# Patient Record
Sex: FEMALE | Race: WHITE | NOT HISPANIC OR LATINO | URBAN - METROPOLITAN AREA
[De-identification: names, ages, dates, MRNs, and addresses within clinical notes are randomized per-mention and may not be internally consistent; named-entity substitution may affect disease eponyms.]

---

## 2023-11-20 ENCOUNTER — EVALUATION (OUTPATIENT)
Dept: OCCUPATIONAL THERAPY | Facility: CLINIC | Age: 58
End: 2023-11-20
Payer: COMMERCIAL

## 2023-11-20 DIAGNOSIS — M65.312 TRIGGER FINGER OF LEFT THUMB: Primary | ICD-10-CM

## 2023-11-20 PROCEDURE — 97535 SELF CARE MNGMENT TRAINING: CPT

## 2023-11-20 PROCEDURE — 97166 OT EVAL MOD COMPLEX 45 MIN: CPT

## 2023-11-20 PROCEDURE — 97140 MANUAL THERAPY 1/> REGIONS: CPT

## 2023-11-20 NOTE — LETTER
2023      No Recipients    Patient: Shannon Lilly   YOB: 1965   Date of Visit: 2023     Encounter Diagnosis     ICD-10-CM    1. Trigger finger of left thumb  M65.312           Dear Dr. Constance Zhang: Thank you for your recent referral of Shannon Lilly. Please review the attached evaluation summary from Chelita's recent visit. Please verify that you agree with the plan of care by signing the attached order. If you have any questions or concerns, please do not hesitate to call. I sincerely appreciate the opportunity to share in the care of one of your patients and hope to have another opportunity to work with you in the near future. Sincerely,    Art Xiao, OT      Referring Provider:     I certify that I have read the below Plan of Care and certify the need for these services furnished under this plan of treatment while under my care. Saurabh Villegas MD  [address] [address]  Via Fax: [phone]        OT Evaluation     Today's date: 2023  Patient name: Shannon Lilly  : 1965  MRN: [id number]  Referring provider: Saurabh Villegas MD  Dx:   Encounter Diagnosis     ICD-10-CM    1. Trigger finger of left thumb  M65.312                      Assessment  Assessment details: Patient presenting with a dx of left thumb Trigger Finger. Patient reports symptoms began over the summer. Patient reports locking and triggering over the past two weeks. Patient was prescribed NSAID for ten days course. Patient was referred to conservative management of OT prior to Ortho. Patient reports typing for a large majority of her work day. Patient had X-Rays completed with no findings. Patient has been conservative management. Patient is right hand dominant. Patient presenting with thumb brace this date.    Impairments: abnormal coordination, abnormal or restricted ROM, abnormal movement, activity intolerance, impaired physical strength, pain with function and weight-bearing intolerance    Symptom irritability: moderateUnderstanding of Dx/Px/POC: good   Prognosis: good    Goals  STGs    Pt will increase  strength by 5-10#. - Not Met    Pt will increase digit ROM by 50%. - Not Met    Pt will demonstrate decrease in edema by 25%. - Not Met    Independent with HEP. - Not Met      LTGs     Pt will increase  strength by an additional 5-10#. - Not Met    Pt will increase digit ROM to WNL. - Not Met    Pt will demonstrate decrease in edema by 50%. - Not Met    Pt will increase pinch strength by 3-5#. - Not Met    Pt will report an increase in ADL/IADL participation. - Not Met    Pt will report a decrease in clicking of thumb. - Not Met    Pt will report no more than a 0/10 pain with activity - Not Met    Pt will increase fine motor coordination as evident by an improvement in 9-hole peg test by 3 seconds. - Not Met              Plan  Plan details: Patient has presenting to OP OT services with a dx of of left thumb trigger finger. Patient demonstrating increased pain, decreased strength, decreased ROM and decreased activity. Pt would benefit from continued Occupational Therapy services one time per week for 4 weeks to return to prior level of function and achieve all established goals.  Thank you for the referral!    Patient would benefit from: OT eval and skilled occupational therapy  Referral necessary: Yes  Planned modality interventions: cryotherapy, TENS, ultrasound, unattended electrical stimulation, thermotherapy: paraffin bath and thermotherapy: hydrocollator packs  Planned therapy interventions: IADL retraining, manual therapy, massage, motor coordination training, patient education, self care, strengthening, stretching, therapeutic activities, therapeutic exercise, home exercise program, functional ROM exercises and fine motor coordination training  Frequency: 1x week  Duration in visits: 4  Duration in weeks: 4  Treatment plan discussed with: patient        Subjective Evaluation    History of Present Illness  Mechanism of injury: Left Trigger Thumb          Not a recurrent problem   Quality of life: good    Patient Goals  Patient goals for therapy: decreased edema, decreased pain, increased motion, increased strength and independence with ADLs/IADLs    Pain  Current pain ratin  At best pain ratin  At worst pain ratin  Location: Left Thumb  Quality: pulling  Relieving factors: support    Social Support    Employment status: working  Hand dominance: right      Diagnostic Tests  X-ray: normal  Treatments  Current treatment: immobilization and occupational therapy      Objective     Neurological Testing     Sensation     Wrist/Hand   Left   Intact: light touch    Right   Intact: light touch    Active Range of Motion     Left Wrist   Wrist flexion: 70 degrees   Wrist extension: 100 degrees   Radial deviation: 30 degrees   Ulnar deviation: 50 degrees     Right Wrist   Normal active range of motion    Left Thumb   Flexion     MP: 40 degrees    DIP: 30 degrees  Palmar Abduction     CMC: 60 degrees    Opposition: WNL    Right Thumb   Opposition: WNL    Additional Active Range of Motion Details  Composite fist noted  Decreased thumb ROM compared to right thumb    Strength/Myotome Testing     Left Wrist/Hand   Wrist extension: 3+  Wrist flexion: 3+  Radial deviation: 3+  Ulnar deviation: 3+     (2nd hand position)     Trial 1: 40    Thumb Strength  Key/Lateral Pinch     Trial 1: 6    Right Wrist/Hand   Normal wrist strength     (2nd hand position)     Trial 1: 55    Thumb Strength   Key/Lateral Pinch     Trial 1: 12    Additional Strength Details  Patient demonstrating with a decrease in wrist,  and pinch strength compared to unaffected upper extremity.       Swelling     Left Wrist/Hand   Thumb     Proximal: 7 cm    Distal: 6.3 cm    Additional Swelling Details  Slight increase in left proximal thumb swelling compared to right hand    Right Wrist/Hand   Thumb     Proximal: 6.8 cm    Distal: 6.3 cm  Neuro Exam:     Functional outcomes   Left 9 peg hole test: 20.27 (seconds)  Right 9 hole peg test: 14.07 (seconds)      Flowsheet Rows      Flowsheet Row Most Recent Value   PT/OT G-Codes    Current Score 51   Projected Score 65          Patient tolerated session well. Patient and therapist discussed exercises as per initial HEP. Patient verbalized understanding of education and demonstrated proper technique. Therapist will make increases as tolerated.  Continue with POC            Precautions: Trigger Finger  Initial Evaluation completed on: 11/20/2023  Re-Evaluation needs to be completed before: 12/20/2023  Insurance: Nicole Lightn: 11/20/2023  Auth Visits: N/A        Manuals 11/20/2023       IASTM Gt#3  GT#6  10' Thumb       Digit Flexor Stretch 30 sec x 3       KT Tape        ART Thumb X 5       Cupping                Neuro Re-Ed  11/20/2023                                               Ther Ex 11/20/2023       Thumb IP Blocking  MP Blocking HEP       Thumb Extension Stretch HEP       Thumb Abduction Stretch HEP       Rubberband  Thumb Flexion  Thumb Ext  Radial Add  Radial Abd  Finger Abd        Opposition        Digi-Flex        Theraputty  Grasps  Pinches  Thumb Presses        Hand Power Pro                Ther Activity 11/20/2023       Tension Pin Pom Pom        Grooved Peg Board                                Modalities 11/20/2023       Ultrasound 3.3 Mhz  50%  0.8 intensity       Paraffin w/ MH 10' end of session       Splinting

## 2023-11-20 NOTE — PROGRESS NOTES
OT Evaluation     Today's date: 2023  Patient name: Jasmine Amado  : 1965  MRN: [id number]  Referring provider: Sal Boxer, MD  Dx:   Encounter Diagnosis     ICD-10-CM    1. Trigger finger of left thumb  M65.312                      Assessment  Assessment details: Patient presenting with a dx of left thumb Trigger Finger. Patient reports symptoms began over the summer. Patient reports locking and triggering over the past two weeks. Patient was prescribed NSAID for ten days course. Patient was referred to conservative management of OT prior to Ortho. Patient reports typing for a large majority of her work day. Patient had X-Rays completed with no findings. Patient has been conservative management. Patient is right hand dominant. Patient presenting with thumb brace this date. Impairments: abnormal coordination, abnormal or restricted ROM, abnormal movement, activity intolerance, impaired physical strength, pain with function and weight-bearing intolerance    Symptom irritability: moderateUnderstanding of Dx/Px/POC: good   Prognosis: good    Goals  STGs    Pt will increase  strength by 5-10#. - Not Met    Pt will increase digit ROM by 50%. - Not Met    Pt will demonstrate decrease in edema by 25%. - Not Met    Independent with HEP. - Not Met      LTGs     Pt will increase  strength by an additional 5-10#. - Not Met    Pt will increase digit ROM to WNL. - Not Met    Pt will demonstrate decrease in edema by 50%. - Not Met    Pt will increase pinch strength by 3-5#. - Not Met    Pt will report an increase in ADL/IADL participation. - Not Met    Pt will report a decrease in clicking of thumb. - Not Met    Pt will report no more than a 0/10 pain with activity - Not Met    Pt will increase fine motor coordination as evident by an improvement in 9-hole peg test by 3 seconds.  - Not Met              Plan  Plan details: Patient has presenting to OP OT services with a dx of of left thumb trigger finger. Patient demonstrating increased pain, decreased strength, decreased ROM and decreased activity. Pt would benefit from continued Occupational Therapy services one time per week for 4 weeks to return to prior level of function and achieve all established goals.  Thank you for the referral!    Patient would benefit from: OT eval and skilled occupational therapy  Referral necessary: Yes  Planned modality interventions: cryotherapy, TENS, ultrasound, unattended electrical stimulation, thermotherapy: paraffin bath and thermotherapy: hydrocollator packs  Planned therapy interventions: IADL retraining, manual therapy, massage, motor coordination training, patient education, self care, strengthening, stretching, therapeutic activities, therapeutic exercise, home exercise program, functional ROM exercises and fine motor coordination training  Frequency: 1x week  Duration in visits: 4  Duration in weeks: 4  Treatment plan discussed with: patient        Subjective Evaluation    History of Present Illness  Mechanism of injury: Left Trigger Thumb          Not a recurrent problem   Quality of life: good    Patient Goals  Patient goals for therapy: decreased edema, decreased pain, increased motion, increased strength and independence with ADLs/IADLs    Pain  Current pain ratin  At best pain ratin  At worst pain ratin  Location: Left Thumb  Quality: pulling  Relieving factors: support    Social Support    Employment status: working  Hand dominance: right      Diagnostic Tests  X-ray: normal  Treatments  Current treatment: immobilization and occupational therapy      Objective     Neurological Testing     Sensation     Wrist/Hand   Left   Intact: light touch    Right   Intact: light touch    Active Range of Motion     Left Wrist   Wrist flexion: 70 degrees   Wrist extension: 100 degrees   Radial deviation: 30 degrees   Ulnar deviation: 50 degrees     Right Wrist   Normal active range of motion    Left Thumb Flexion     MP: 40 degrees    DIP: 30 degrees  Palmar Abduction     CMC: 60 degrees    Opposition: WNL    Right Thumb   Opposition: WNL    Additional Active Range of Motion Details  Composite fist noted  Decreased thumb ROM compared to right thumb    Strength/Myotome Testing     Left Wrist/Hand   Wrist extension: 3+  Wrist flexion: 3+  Radial deviation: 3+  Ulnar deviation: 3+     (2nd hand position)     Trial 1: 40    Thumb Strength  Key/Lateral Pinch     Trial 1: 6    Right Wrist/Hand   Normal wrist strength     (2nd hand position)     Trial 1: 55    Thumb Strength   Key/Lateral Pinch     Trial 1: 12    Additional Strength Details  Patient demonstrating with a decrease in wrist,  and pinch strength compared to unaffected upper extremity. Swelling     Left Wrist/Hand   Thumb     Proximal: 7 cm    Distal: 6.3 cm    Additional Swelling Details  Slight increase in left proximal thumb swelling compared to right hand    Right Wrist/Hand   Thumb     Proximal: 6.8 cm    Distal: 6.3 cm  Neuro Exam:     Functional outcomes   Left 9 peg hole test: 20.27 (seconds)  Right 9 hole peg test: 14.07 (seconds)      Flowsheet Rows      Flowsheet Row Most Recent Value   PT/OT G-Codes    Current Score 51   Projected Score 65          Patient tolerated session well. Patient and therapist discussed exercises as per initial HEP. Patient verbalized understanding of education and demonstrated proper technique. Therapist will make increases as tolerated.  Continue with POC            Precautions: Trigger Finger  Initial Evaluation completed on: 11/20/2023  Re-Evaluation needs to be completed before: 12/20/2023  Insurance: Loretta Inks: 11/20/2023  Auth Visits: N/A        Manuals 11/20/2023       IASTM Gt#3  GT#6  10' Thumb       Digit Flexor Stretch 30 sec x 3       KT Tape        ART Thumb X 5       Cupping                Neuro Re-Ed  11/20/2023                                               Ther Ex 11/20/2023       Thumb IP Blocking  MP Blocking HEP       Thumb Extension Stretch HEP       Thumb Abduction Stretch HEP       Rubberband  Thumb Flexion  Thumb Ext  Radial Add  Radial Abd  Finger Abd        Opposition        Digi-Flex        Theraputty  Grasps  Pinches  Thumb Presses        Hand Power Pro                Ther Activity 11/20/2023       Tension Pin Pom Pom        Grooved Peg Board                                Modalities 11/20/2023       Ultrasound 3.3 Mhz  50%  0.8 intensity       Paraffin w/ MH 10' end of session       Splinting        MH

## 2023-11-29 ENCOUNTER — OFFICE VISIT (OUTPATIENT)
Dept: OCCUPATIONAL THERAPY | Facility: CLINIC | Age: 58
End: 2023-11-29
Payer: COMMERCIAL

## 2023-11-29 DIAGNOSIS — M65.312 TRIGGER FINGER OF LEFT THUMB: Primary | ICD-10-CM

## 2023-11-29 PROCEDURE — 97110 THERAPEUTIC EXERCISES: CPT

## 2023-11-29 PROCEDURE — 97140 MANUAL THERAPY 1/> REGIONS: CPT

## 2023-11-29 PROCEDURE — 97018 PARAFFIN BATH THERAPY: CPT

## 2023-11-29 PROCEDURE — 97035 APP MDLTY 1+ULTRASOUND EA 15: CPT

## 2023-11-29 NOTE — PROGRESS NOTES
Daily Note     Today's date: 2023  Patient name: Hans Lezama  : 1965  MRN: [id number]  Referring provider: Mandie Queen MD  Dx:   Encounter Diagnosis     ICD-10-CM    1. Trigger finger of left thumb  M65.312                      Subjective: It's doing much better this week than last week. Objective: See treatment diary below      Assessment: Tolerated treatment well. Patient demonstrated fatigue post treatment, exhibited good technique with therapeutic exercises, and would benefit from continued OT. Pain presents with decrease pain than previous tx session. Tolerated light strengthening well. Plan: Continue per plan of care. Progress treatment as tolerated.           Precautions: Trigger Finger  Initial Evaluation completed on: 2023  Re-Evaluation needs to be completed before: 2023  Insurance: [employer]: 2023  Auth Visits: N/A        Manuals 2023      IASTM Gt#3  GT#6  10' Thumb GT #3  10' thumb      Digit Flexor Stretch 30 sec x 3 30 sec x 3      KT Tape        ART Thumb X 5       Cupping                Neuro Re-Ed  2023                                              Ther Ex 2023      Thumb IP Blocking  MP Blocking HEP X 10      Thumb Extension Stretch HEP 10 sec x 5      Thumb Abduction Stretch HEP       Rubberband  Thumb Flexion  Thumb Ext  Radial Add  Radial Abd  Finger Abd  Red  X 10/2      Opposition  Tennis ball  beads      Digi-Flex  Red x 20      Theraputty  Grasps  Pinches  Thumb Presses        Hand Power Pro                Ther Activity 2023      Tension Pin Pom Pom        Grooved Peg Board                                Modalities 2023      Ultrasound 3.3 Mhz  50%  0.8 intensity 3.3 MHz  50%  0.8 intensity      Paraffin w/ MH 10' end of session 10' end of session      Splinting

## 2023-12-06 ENCOUNTER — OFFICE VISIT (OUTPATIENT)
Dept: OCCUPATIONAL THERAPY | Facility: CLINIC | Age: 58
End: 2023-12-06
Payer: COMMERCIAL

## 2023-12-06 DIAGNOSIS — M65.312 TRIGGER FINGER OF LEFT THUMB: Primary | ICD-10-CM

## 2023-12-06 PROCEDURE — 97110 THERAPEUTIC EXERCISES: CPT

## 2023-12-06 PROCEDURE — 97018 PARAFFIN BATH THERAPY: CPT

## 2023-12-06 PROCEDURE — 97140 MANUAL THERAPY 1/> REGIONS: CPT

## 2023-12-06 PROCEDURE — 97035 APP MDLTY 1+ULTRASOUND EA 15: CPT

## 2023-12-06 NOTE — PROGRESS NOTES
Daily Note     Today's date: 2023  Patient name: Diya Resendiz  : 1965  MRN: [id number]  Referring provider: Latesha Linares MD  Dx:   Encounter Diagnosis     ICD-10-CM    1. Trigger finger of left thumb  M65.312                      Subjective: It still hurts but a little better. Objective: See treatment diary below      Assessment: Tolerated treatment well. Patient demonstrated fatigue post treatment, exhibited good technique with therapeutic exercises, and would benefit from continued OT. Pain 3, triggering still happening. Tolerated additional exercises. Plan: Continue per plan of care. Progress treatment as tolerated.        Precautions: Trigger Finger  Initial Evaluation completed on: 2023  Re-Evaluation needs to be completed before: 2023  Insurance: [employer]: 2023  Auth Visits: N/A        Manuals 2023     IASTM Gt#3  GT#6  10' Thumb GT #3  10' thumb GT #3  10' thumb     Digit Flexor Stretch 30 sec x 3 30 sec x 3 30 sec x 3     KT Tape        ART Thumb X 5       Cupping                Neuro Re-Ed  2023                                             Ther Ex 2023     Thumb IP Blocking  MP Blocking HEP X 10 X 10     Thumb Extension Stretch HEP 10 sec x 5 10 sec x 5     Thumb Abduction Stretch HEP       Rubberband  Thumb Flexion  Thumb Ext  Radial Add  Radial Abd  Finger Abd  Red  X 10/2 Red  X 10/2  X 10/2      X 10/2     Opposition  Tennis ball  beads Tennis ball  beads     Digi-Flex  Red x 20 Red x 20     Theraputty  Grasps  Pinches  Thumb Presses        Hand Power Pro                Ther Activity 2023     Tension Pin Pom Pom   YTP     Grooved Peg Board   Performed all in/out                             Modalities 2023     Ultrasound 3.3 Mhz  50%  0.8 intensity 3.3 MHz  50%  0.8 intensity 3.3 MHz  50%  0.8 intensity Paraffin w/ MH 10' end of session 10' end of session 10' end of session     Splinting        MH

## 2023-12-13 ENCOUNTER — OFFICE VISIT (OUTPATIENT)
Dept: OCCUPATIONAL THERAPY | Facility: CLINIC | Age: 58
End: 2023-12-13
Payer: COMMERCIAL

## 2023-12-13 DIAGNOSIS — M65.312 TRIGGER FINGER OF LEFT THUMB: Primary | ICD-10-CM

## 2023-12-13 PROCEDURE — 97018 PARAFFIN BATH THERAPY: CPT

## 2023-12-13 PROCEDURE — 97110 THERAPEUTIC EXERCISES: CPT

## 2023-12-13 PROCEDURE — 97140 MANUAL THERAPY 1/> REGIONS: CPT

## 2023-12-13 NOTE — PROGRESS NOTES
Daily Note     Today's date: 2023  Patient name: Steve William  : 1965  MRN: [id number]  Referring provider: Damien Arguello MD  Dx:   Encounter Diagnosis     ICD-10-CM    1. Trigger finger of left thumb  M65.312                      Subjective: It does feel a little better. Objective: See treatment diary below      Assessment: Tolerated treatment well. Patient demonstrated fatigue post treatment, exhibited good technique with therapeutic exercises, and would benefit from continued OT. Pain pre 0 at rest and increase to 5-6, post tx 3-4. Trialed KT tape, pt verbalized it felt better with same.  40#, pinch lateral 6#. Plan: Continue per plan of care. Progress treatment as tolerated.        Precautions: Trigger Finger  Initial Evaluation completed on: 2023  Re-Evaluation needs to be completed before: 2023  Insurance: [employer]: 2023  Auth Visits: N/A        Manuals 2023    IASTM Gt#3  GT#6  10' Thumb GT #3  10' thumb GT #3  10' thumb GT # 3    Digit Flexor Stretch 30 sec x 3 30 sec x 3 30 sec x 3 30 sec x 3    KT Tape    performed    ART Thumb X 5       Cupping                Neuro Re-Ed  2023                                            Ther Ex 2023    Thumb IP Blocking  MP Blocking HEP X 10 X 10 X 10    Thumb Extension Stretch HEP 10 sec x 5 10 sec x 5 10 sec x 5    Thumb Abduction Stretch HEP       Rubberband  Thumb Flexion  Thumb Ext  Radial Add  Radial Abd  Finger Abd  Red  X 10/2 Red  X 10/2  X 10/2      X 10/2 Red  X 10/2  X 10/2      X 10/2    Opposition  Tennis ball  beads Tennis ball  beads Tennis ball  beads    Digi-Flex  Red x 20 Red x 20 Red x 20    Theraputty  Grasps  Pinches  Thumb Presses        Hand Power Pro        Power web    Thumb  Intrinsic plus    Yellow  X 20  X 20    Ther Activity 2023 12/6/2023 12/13/2023    Tension Pin Pom Pom   YTP YTP    Grooved Peg Board   Performed all in/out 2:04                            Modalities 11/20/2023 11/29/2023 12/6/2023 12/13/2023    Ultrasound 3.3 Mhz  50%  0.8 intensity 3.3 MHz  50%  0.8 intensity 3.3 MHz  50%  0.8 intensity 3.3 MHz  50%  0.8 intensity    Paraffin w/ MH 10' end of session 10' end of session 10' end of session 10' end of session    Splinting

## 2023-12-20 ENCOUNTER — OFFICE VISIT (OUTPATIENT)
Dept: OCCUPATIONAL THERAPY | Facility: CLINIC | Age: 58
End: 2023-12-20
Payer: COMMERCIAL

## 2023-12-20 DIAGNOSIS — M65.312 TRIGGER FINGER OF LEFT THUMB: Primary | ICD-10-CM

## 2023-12-20 PROCEDURE — 97150 GROUP THERAPEUTIC PROCEDURES: CPT

## 2023-12-20 PROCEDURE — 97110 THERAPEUTIC EXERCISES: CPT

## 2023-12-20 NOTE — PROGRESS NOTES
Daily Note     Today's date: 2023  Patient name: Chelita King  : 1965  MRN: [id number]  Referring provider: Blanquita Villa MD  Dx:   Encounter Diagnosis     ICD-10-CM    1. Trigger finger of left thumb  M65.312                      TIME:  4:45 - 5:00 =   5:00 - 5:15 = Group  5:15 - 5:45 = Unsupervised    Subjective: I think I have a little more room before it triggers, ill take what I can get.      Objective: See treatment diary below      Assessment: Tolerated treatment well. Patient demonstrated fatigue post treatment, exhibited good technique with therapeutic exercises, and would benefit from continued OT. Pt states no pain at rest, and when it triggers it is less pain than it used to be. Pt states KT tape was helpful with it lasting 3 days prior to taking it off and having a friend re-do it. Minimal pain/discomfort and fatigue noted during exercises, with stretch breaks in between required. Pt participated in skilled OT this date with focus to ROM, pain management, and HEP development, for increased safety and engagement in ADL/IADL tasks.     Plan: Continue per plan of care.  Progress treatment as tolerated.       Precautions: Trigger Finger  Initial Evaluation completed on: 2023  Re-Evaluation needs to be completed before: 2023  Insurance: [employer]  FOTO: 2023  Auth Visits: N/A        Manuals  2023   IASTM  GT #3  10' thumb GT #3  10' thumb GT # 3 GT #3 8'   Digit Flexor Stretch  30 sec x 3 30 sec x 3 30 sec x 3    KT Tape    performed Applied   ART Thumb        Cupping                Neuro Re-Ed   2023                                           Ther Ex  2023   Thumb IP Blocking  MP Blocking  X 10 X 10 X 10    Thumb Extension Stretch  10 sec x 5 10 sec x 5 10 sec x 5    Thumb Abduction Stretch        Rubberband  Thumb Flexion  Thumb Ext  Radial Add  Radial  Abd  Finger Abd  Red  X 10/2 Red  X 10/2  X 10/2      X 10/2 Red  X 10/2  X 10/2      X 10/2 Red RB  X 25  X 20      X 20   Opposition  Tennis ball  beads Tennis ball  beads Tennis ball  beads Tennis Ball  beads   Digi-Flex  Red x 20 Red x 20 Red x 20 Red  X 20     Theraputty  Grasps  Pinches  Thumb Presses        Hand Power Pro        Power web    Thumb  Intrinsic plus    Yellow  X 20  X 20 Yellow  X 20  X 20   Ther Activity  11/29/2023 12/6/2023 12/13/2023 12/20/2023   Tension Pin Pom Pom   YTP YTP Yellow pin all poms   Grooved Peg Board   Performed all in/out 2:04                            Modalities  11/29/2023 12/6/2023 12/13/2023 12/20/2023   Ultrasound  3.3 MHz  50%  0.8 intensity 3.3 MHz  50%  0.8 intensity 3.3 MHz  50%  0.8 intensity 3.3 Mhz, 50%, 0.8 intensity   Paraffin w/ MH  10' end of session 10' end of session 10' end of session 10' end of session   Splinting

## 2023-12-29 ENCOUNTER — EVALUATION (OUTPATIENT)
Dept: OCCUPATIONAL THERAPY | Facility: CLINIC | Age: 58
End: 2023-12-29
Payer: COMMERCIAL

## 2023-12-29 DIAGNOSIS — M65.312 TRIGGER FINGER OF LEFT THUMB: Primary | ICD-10-CM

## 2023-12-29 PROCEDURE — 97140 MANUAL THERAPY 1/> REGIONS: CPT

## 2023-12-29 PROCEDURE — 97110 THERAPEUTIC EXERCISES: CPT

## 2023-12-29 PROCEDURE — 97018 PARAFFIN BATH THERAPY: CPT

## 2023-12-29 PROCEDURE — 97166 OT EVAL MOD COMPLEX 45 MIN: CPT

## 2023-12-29 NOTE — PROGRESS NOTES
OT Re-Evaluation     Today's date: 2023  Patient name: Chelita King  : 1965  MRN: [id number]  Referring provider: Blanquita Villa MD  Dx:   Encounter Diagnosis     ICD-10-CM    1. Trigger finger of left thumb  M65.312                        Assessment  Assessment details: Patient presenting with a dx of left thumb Trigger Finger. Patient reports symptoms began over the summer. Patient reports locking and triggering over the past two weeks. Patient was prescribed NSAID for ten days course. Patient was referred to conservative management of OT prior to Ortho. Patient reports typing for a large majority of her work day.  Patient had X-Rays completed with no findings. Patient has been conservative management. Patient is right hand dominant. Patient presenting with thumb brace this date.     Re-assessment completed on 2024. Patient has consistently attended OP OT services since start of care. Patient has made steady progress towards established goals. Patient demonstrating with increases in  strength, pinch strength, fine motor coordination and decreases in pain. Patient has not met all established goals and will benefit from continued OT services to maximize level of function. Patient does not have any follow-up appointments with PCP. Patient reports improvement in symptoms as compared to start of care. Patient continues to present with decrease IP flexion and swelling. Patient will trial use of silicone gel sleeve to decrease swelling to see if symptoms continue to improve. Patient and therapist discussed continuation of OT services 1 a week for an additional 4 weeks.     Impairments: abnormal coordination, abnormal or restricted ROM, abnormal movement, activity intolerance, impaired physical strength, pain with function and weight-bearing intolerance    Symptom irritability: moderateUnderstanding of Dx/Px/POC: good   Prognosis: good    Goals  STGs    Pt will increase  strength by 5-10#. -  Met    Pt will increase digit ROM by 50%. - Progressing    Pt will demonstrate decrease in edema by 25%. - Not Met    Independent with HEP. - Met      LTGs     Pt will increase  strength by an additional 5-10#. - Met    Pt will increase digit ROM to WNL. - Progressing    Pt will demonstrate decrease in edema by 50%. - Not Met    Pt will increase pinch strength by 3-5#. - Progressing    Pt will report an increase in ADL/IADL participation. - Progressing    Pt will report a decrease in clicking of thumb. - Progressing    Pt will report no more than a 0/10 pain with activity - Progressing    Pt will increase fine motor coordination as evident by an improvement in 9-hole peg test by 3 seconds. - Progressing              Plan  Plan details: Continue with POC    Patient would benefit from: OT eval and skilled occupational therapy  Referral necessary: Yes  Planned modality interventions: cryotherapy, TENS, ultrasound, unattended electrical stimulation, thermotherapy: paraffin bath and thermotherapy: hydrocollator packs  Planned therapy interventions: IADL retraining, manual therapy, massage, motor coordination training, patient education, self care, strengthening, stretching, therapeutic activities, therapeutic exercise, home exercise program, functional ROM exercises and fine motor coordination training  Frequency: 1x week  Duration in visits: 4  Duration in weeks: 4  Treatment plan discussed with: patient        Subjective Evaluation    History of Present Illness  Mechanism of injury: Left Trigger Thumb          Not a recurrent problem   Quality of life: good    Patient Goals  Patient goals for therapy: decreased edema, decreased pain, increased motion, increased strength and independence with ADLs/IADLs    Pain  Current pain ratin  At best pain ratin  At worst pain ratin  Location: Left Thumb  Quality: pulling  Relieving factors: support    Social Support    Employment status: working  Hand dominance:  right      Diagnostic Tests  X-ray: normal  Treatments  Current treatment: immobilization and occupational therapy      Objective     Neurological Testing     Sensation     Wrist/Hand   Left   Intact: light touch    Right   Intact: light touch    Active Range of Motion     Left Wrist   Wrist flexion: 70 degrees   Wrist extension: 100 degrees   Radial deviation: 30 degrees   Ulnar deviation: 50 degrees     Right Wrist   Normal active range of motion    Left Thumb   Flexion     MP: 50 degrees    DIP: 30 degrees  Palmar Abduction     CMC: 70 degrees    Opposition: WNL    Right Thumb   Opposition: WNL    Additional Active Range of Motion Details  Composite fist noted  Increased thumb ROM compared to SOC    Strength/Myotome Testing     Left Wrist/Hand   Wrist extension: 4-  Wrist flexion: 4-  Radial deviation: 4-  Ulnar deviation: 4-     (2nd hand position)     Trial 1: 50    Thumb Strength  Key/Lateral Pinch     Trial 1: 8    Right Wrist/Hand   Normal wrist strength     (2nd hand position)     Trial 1: 55    Thumb Strength   Key/Lateral Pinch     Trial 1: 12    Additional Strength Details  Patient demonstrating with an increase in wrist,  and pinch strength compared to last assessment.      Swelling     Left Wrist/Hand   Thumb     Proximal: 7 cm    Distal: 6.3 cm    Additional Swelling Details  Slight increase in left proximal thumb swelling compared to right hand    Right Wrist/Hand   Thumb     Proximal: 6.8 cm    Distal: 6.3 cm  Neuro Exam:     Functional outcomes   Left 9 peg hole test: 20.27 (seconds)  Right 9 hole peg test: 14.07 (seconds)  Functional outcome comment: Re-assessment completed on 12/29/2023  Left Hand: 20.24                     Precautions: Trigger Finger  Initial Evaluation completed on: 11/20/2023  Re-Evaluation needs to be completed before: 01/29/2023  Insurance: [employer]  FOTO: 11/20/2023  Auth Visits: N/A        Manuals 12/29/2023       IAS GT #3 8'       Digit Flexor Stretch         KT Tape        ART Thumb        Cupping        Edema Manage Silicone Gel Sleeve               Neuro Re-Ed  12/29/2023                                               Ther Ex 12/29/2023       Thumb IP Blocking  MP Blocking        Thumb Extension Stretch        Thumb Abduction Stretch        Rubberband  Thumb Flexion  Thumb Ext  Radial Add  Radial Abd  Finger Abd Red RB  X 25  X 20      X 20       Opposition Tennis Ball  beads       Digi-Flex Red  X 20       Theraputty  Grasps  Pinches  Thumb Presses        Hand Power Pro        Power web    Thumb  Intrinsic plus Yellow  X 20  X 20               Ther Activity 12/29/2023       Tension Pin Pom Pom Yellow pin all poms       Grooved Peg Board                                Modalities 12/29/2023       Ultrasound 3.3 Mhz, 50%, 0.8 intensity       Paraffin w/ MH 10' end of session       Splinting        MH

## 2023-12-29 NOTE — LETTER
2023      No Recipients    Patient: Chelita King   YOB: 1965   Date of Visit: 2023     Encounter Diagnosis     ICD-10-CM    1. Trigger finger of left thumb  M65.312           Dear Dr. Villa:    Thank you for your recent referral of Chelita King. Please review the attached evaluation summary from Chelita's recent visit.     Please verify that you agree with the plan of care by signing the attached order.     If you have any questions or concerns, please do not hesitate to call.     I sincerely appreciate the opportunity to share in the care of one of your patients and hope to have another opportunity to work with you in the near future.     Sincerely,    Toan Thomas, OT      Referring Provider:     I certify that I have read the below Plan of Care and certify the need for these services furnished under this plan of treatment while under my care.                    Blanquita Villa MD  [address] [place] [id number]  Via Fax: [phone]        OT Re-Evaluation     Today's date: 2023  Patient name: Chelita King  : 1965  MRN: [id number]  Referring provider: Blanquita Villa MD  Dx:   Encounter Diagnosis     ICD-10-CM    1. Trigger finger of left thumb  M65.312                        Assessment  Assessment details: Patient presenting with a dx of left thumb Trigger Finger. Patient reports symptoms began over the summer. Patient reports locking and triggering over the past two weeks. Patient was prescribed NSAID for ten days course. Patient was referred to conservative management of OT prior to Ortho. Patient reports typing for a large majority of her work day.  Patient had X-Rays completed with no findings. Patient has been conservative management. Patient is right hand dominant. Patient presenting with thumb brace this date.     Re-assessment completed on 2024. Patient has consistently attended OP OT services since start of care. Patient has made  steady progress towards established goals. Patient demonstrating with increases in  strength, pinch strength, fine motor coordination and decreases in pain. Patient has not met all established goals and will benefit from continued OT services to maximize level of function. Patient does not have any follow-up appointments with PCP. Patient reports improvement in symptoms as compared to start of care. Patient continues to present with decrease IP flexion and swelling. Patient will trial use of silicone gel sleeve to decrease swelling to see if symptoms continue to improve. Patient and therapist discussed continuation of OT services 1 a week for an additional 4 weeks.     Impairments: abnormal coordination, abnormal or restricted ROM, abnormal movement, activity intolerance, impaired physical strength, pain with function and weight-bearing intolerance    Symptom irritability: moderateUnderstanding of Dx/Px/POC: good   Prognosis: good    Goals  STGs    Pt will increase  strength by 5-10#. - Met    Pt will increase digit ROM by 50%. - Progressing    Pt will demonstrate decrease in edema by 25%. - Not Met    Independent with HEP. - Met      LTGs     Pt will increase  strength by an additional 5-10#. - Met    Pt will increase digit ROM to WNL. - Progressing    Pt will demonstrate decrease in edema by 50%. - Not Met    Pt will increase pinch strength by 3-5#. - Progressing    Pt will report an increase in ADL/IADL participation. - Progressing    Pt will report a decrease in clicking of thumb. - Progressing    Pt will report no more than a 0/10 pain with activity - Progressing    Pt will increase fine motor coordination as evident by an improvement in 9-hole peg test by 3 seconds. - Progressing              Plan  Plan details: Continue with POC    Patient would benefit from: OT eval and skilled occupational therapy  Referral necessary: Yes  Planned modality interventions: cryotherapy, TENS, ultrasound, unattended  electrical stimulation, thermotherapy: paraffin bath and thermotherapy: hydrocollator packs  Planned therapy interventions: IADL retraining, manual therapy, massage, motor coordination training, patient education, self care, strengthening, stretching, therapeutic activities, therapeutic exercise, home exercise program, functional ROM exercises and fine motor coordination training  Frequency: 1x week  Duration in visits: 4  Duration in weeks: 4  Treatment plan discussed with: patient        Subjective Evaluation    History of Present Illness  Mechanism of injury: Left Trigger Thumb          Not a recurrent problem   Quality of life: good    Patient Goals  Patient goals for therapy: decreased edema, decreased pain, increased motion, increased strength and independence with ADLs/IADLs    Pain  Current pain ratin  At best pain ratin  At worst pain ratin  Location: Left Thumb  Quality: pulling  Relieving factors: support    Social Support    Employment status: working  Hand dominance: right      Diagnostic Tests  X-ray: normal  Treatments  Current treatment: immobilization and occupational therapy      Objective     Neurological Testing     Sensation     Wrist/Hand   Left   Intact: light touch    Right   Intact: light touch    Active Range of Motion     Left Wrist   Wrist flexion: 70 degrees   Wrist extension: 100 degrees   Radial deviation: 30 degrees   Ulnar deviation: 50 degrees     Right Wrist   Normal active range of motion    Left Thumb   Flexion     MP: 50 degrees    DIP: 30 degrees  Palmar Abduction     CMC: 70 degrees    Opposition: WNL    Right Thumb   Opposition: WNL    Additional Active Range of Motion Details  Composite fist noted  Increased thumb ROM compared to SOC    Strength/Myotome Testing     Left Wrist/Hand   Wrist extension: 4-  Wrist flexion: 4-  Radial deviation: 4-  Ulnar deviation: 4-     (2nd hand position)     Trial 1: 50    Thumb Strength  Key/Lateral Pinch     Trial 1:  8    Right Wrist/Hand   Normal wrist strength     (2nd hand position)     Trial 1: 55    Thumb Strength   Key/Lateral Pinch     Trial 1: 12    Additional Strength Details  Patient demonstrating with an increase in wrist,  and pinch strength compared to last assessment.      Swelling     Left Wrist/Hand   Thumb     Proximal: 7 cm    Distal: 6.3 cm    Additional Swelling Details  Slight increase in left proximal thumb swelling compared to right hand    Right Wrist/Hand   Thumb     Proximal: 6.8 cm    Distal: 6.3 cm  Neuro Exam:     Functional outcomes   Left 9 peg hole test: 20.27 (seconds)  Right 9 hole peg test: 14.07 (seconds)  Functional outcome comment: Re-assessment completed on 12/29/2023  Left Hand: 20.24                     Precautions: Trigger Finger  Initial Evaluation completed on: 11/20/2023  Re-Evaluation needs to be completed before: 01/29/2023  Insurance: [employer]  FOTO: 11/20/2023  Auth Visits: N/A        Manuals 12/29/2023       IASTM GT #3 8'       Digit Flexor Stretch        KT Tape        ART Thumb        Cupping        Edema Manage Silicone Gel Sleeve               Neuro Re-Ed  12/29/2023                                               Ther Ex 12/29/2023       Thumb IP Blocking  MP Blocking        Thumb Extension Stretch        Thumb Abduction Stretch        Rubberband  Thumb Flexion  Thumb Ext  Radial Add  Radial Abd  Finger Abd Red RB  X 25  X 20      X 20       Opposition Tennis Ball  beads       Digi-Flex Red  X 20       Theraputty  Grasps  Pinches  Thumb Presses        Hand Power Pro        Power web    Thumb  Intrinsic plus Yellow  X 20  X 20               Ther Activity 12/29/2023       Tension Pin Pom Pom Yellow pin all poms       Grooved Peg Board                                Modalities 12/29/2023       Ultrasound 3.3 Mhz, 50%, 0.8 intensity       Paraffin w/ TAMI 10' end of session       Splinting

## 2024-01-10 ENCOUNTER — OFFICE VISIT (OUTPATIENT)
Dept: OCCUPATIONAL THERAPY | Facility: CLINIC | Age: 59
End: 2024-01-10
Payer: COMMERCIAL

## 2024-01-10 DIAGNOSIS — M65.312 TRIGGER FINGER OF LEFT THUMB: Primary | ICD-10-CM

## 2024-01-10 PROCEDURE — 97110 THERAPEUTIC EXERCISES: CPT

## 2024-01-10 PROCEDURE — 97140 MANUAL THERAPY 1/> REGIONS: CPT

## 2024-01-10 PROCEDURE — 97018 PARAFFIN BATH THERAPY: CPT

## 2024-01-10 NOTE — PROGRESS NOTES
Daily Note     Today's date: 1/10/2024  Patient name: Chelita King  : 1965  MRN: [id number]  Referring provider: Blanquita Villa MD  Dx:   Encounter Diagnosis     ICD-10-CM    1. Trigger finger of left thumb  M65.312                      Subjective: It seems to be a little      Objective: See treatment diary below      Assessment: Tolerated treatment well. Patient demonstrated fatigue post treatment, exhibited good technique with therapeutic exercises, and would benefit from continued OT      Plan: Continue per plan of care.  Progress treatment as tolerated.               Precautions: Trigger Finger  Initial Evaluation completed on: 2023  Re-Evaluation needs to be completed before: 2023  Insurance: [employer]  FOTO: 2023  Auth Visits: N/A        Manuals 2023 1/10/2024      IASTM GT #3 8' Gt #3      Digit Flexor Stretch        KT Tape        ART Thumb        Cupping        Edema Manage Silicone Gel Sleeve               Neuro Re-Ed  2023 1/10/2024                                              Ther Ex 2023 1/10/2024      Thumb IP Blocking  MP Blocking        Thumb Extension Stretch        Thumb Abduction Stretch        Rubberband  Thumb Flexion  Thumb Ext  Radial Add  Radial Abd  Finger Abd Red RB  X 25  X 20      X 20 Red RB  X 25  X 20      X 20      Opposition Tennis Ball  beads Tennis ball  beads      Digi-Flex Red  X 20 Red  X 20      Theraputty  Grasps  Pinches  Thumb Presses        Thumb helper  Red RB x 20      Power web    Thumb  Intrinsic plus Yellow  X 20  X 20 Yellow  X 20  X 20              Ther Activity 2023 1/10/2024      Tension Pin Pom Pom Yellow pin all poms Yellow pin all poms      Grooved Peg Board  1:29                              Modalities 2023 1/10/2024      Ultrasound 3.3 Mhz, 50%, 0.8 intensity 3.3 MHz  50%  0.8 intensity      Paraffin w/ MH 10' end of session 10' end of session      Splinting

## 2024-01-17 ENCOUNTER — OFFICE VISIT (OUTPATIENT)
Dept: OCCUPATIONAL THERAPY | Facility: CLINIC | Age: 59
End: 2024-01-17
Payer: COMMERCIAL

## 2024-01-17 DIAGNOSIS — M65.312 TRIGGER FINGER OF LEFT THUMB: Primary | ICD-10-CM

## 2024-01-17 PROCEDURE — 97110 THERAPEUTIC EXERCISES: CPT

## 2024-01-17 PROCEDURE — 97140 MANUAL THERAPY 1/> REGIONS: CPT

## 2024-01-17 PROCEDURE — 97018 PARAFFIN BATH THERAPY: CPT

## 2024-01-17 NOTE — PROGRESS NOTES
Daily Note     Today's date: 2024  Patient name: Chelita King  : 1965  MRN: [id number]  Referring provider: Blanquita Villa MD  Dx:   Encounter Diagnosis     ICD-10-CM    1. Trigger finger of left thumb  M65.312                      Subjective: It's about 80-85% better.      Objective: See treatment diary below      Assessment: Tolerated treatment well. Patient demonstrated fatigue post treatment, exhibited good technique with therapeutic exercises, and would benefit from continued OT. Denied pain post tx. Tolerated increase resistance. Pt verbalized her finger still triggers at times but she doesn't have to manually unlock it.      Plan: Continue per plan of care.  Progress treatment as tolerated.       Precautions: Trigger Finger  Initial Evaluation completed on: 2023  Re-Evaluation needs to be completed before: 2023  Insurance: [employer]  FOTO: 2023  Auth Visits: N/A        Manuals 2023 1/10/2024 2024     IASTM GT #3 8' Gt #3 GT #3     Digit Flexor Stretch        KT Tape        ART Thumb        Cupping   Extensor mass     Edema Manage Silicone Gel Sleeve               Neuro Re-Ed  2023 1/10/2024 2024                                             Ther Ex 2023 1/10/2024 2024     Thumb IP Blocking  MP Blocking        Thumb Extension Stretch        Thumb Abduction Stretch        Rubberband  Thumb Flexion  Thumb Ext  Radial Add  Radial Abd  Finger Abd Red RB  X 25  X 20      X 20 Red RB  X 25  X 20      X 20 Red RB  X 25  X 20      X 20     Opposition Tennis Ball  beads Tennis ball  beads Tennis ball  beads     Digi-Flex Red  X 20 Red  X 20 Green  X 20     Theraputty  Grasps  Pinches  Thumb Presses        Thumb helper  Red RB x 20 Red RB x 20     Power web    Thumb  Intrinsic plus Yellow  X 20  X 20 Yellow  X 20  X 20 Yellow  X 20  X 20             Ther Activity 2023 1/10/2024 2024     Tension Pin Pom Pom Yellow pin all  poms Yellow pin all poms Y/RTP     Grooved Peg Board  1:29 1:35                             Modalities 12/29/2023 1/10/2024 1/17/2024     Ultrasound 3.3 Mhz, 50%, 0.8 intensity 3.3 MHz  50%  0.8 intensity 3.3 MHz  50%  0.8 intensity     Paraffin w/ MH 10' end of session 10' end of session 10' end of session     Splinting        MH

## 2024-01-30 NOTE — PROGRESS NOTES
Daily Note     Today's date: 2024  Patient name: Chelita King  : 1965  MRN: [id number]  Referring provider: Blanquita Villa MD  Dx:   Encounter Diagnosis     ICD-10-CM    1. Trigger finger of left thumb  M65.312                      Subjective: It still clicks but it's not painful. I can do more with it.      Objective: See treatment diary below      Assessment: Tolerated treatment well. Patient demonstrated fatigue post treatment, exhibited good technique with therapeutic exercises, and would benefit from continued OT. Denied pain pre and post tx. Mild extensor mass tightness noted. Pt reports doing 85% better overall.      Plan: Continue per plan of care.  Progress treatment as tolerated.       Precautions: Trigger Finger  Initial Evaluation completed on: 2023  Re-Evaluation needs to be completed before: 2023  Insurance: [employer]  FOTO: 2023  Auth Visits: N/A        Manuals 2023 1/10/2024 2024 2024    IASTM GT #3 8' Gt #3 GT #3 GT#3 thumb, palm and ext mass    Digit Flexor Stretch        KT Tape        ART Thumb        Cupping   Extensor mass     Edema Manage Silicone Gel Sleeve               Neuro Re-Ed  2023 1/10/2024 2024 2024                                            Ther Ex 2023 1/10/2024 2024 2024    Thumb IP Blocking  MP Blocking        Thumb Extension Stretch        Thumb Abduction Stretch        Rubberband  Thumb Flexion  Thumb Ext  Radial Add  Radial Abd  Finger Abd Red RB  X 25  X 20      X 20 Red RB  X 25  X 20      X 20 Red RB  X 25  X 20      X 20 Blue RB  X 25  X 20      X 20    Opposition Tennis Ball  beads Tennis ball  beads Tennis ball  beads Tennis ball  beads    Digi-Flex Red  X 20 Red  X 20 Green  X 20 Green  X 20    Theraputty  Grasps  Pinches  Thumb Presses        Thumb helper  Red RB x 20 Red RB x 20 2 Red RB's x 20    Power web    Thumb  Intrinsic plus Yellow  X 20  X 20  Yellow  X 20  X 20 Yellow  X 20  X 20 Teracotta  X 20  X 20            Ther Activity 12/29/2023 1/10/2024 1/17/2024 1/31/2024    Tension Pin Pom Pom Yellow pin all poms Yellow pin all poms Y/RTP RTP    Grooved Peg Board  1:29 1:35 1:21                            Modalities 12/29/2023 1/10/2024 1/17/2024 1/31/2024    Ultrasound 3.3 Mhz, 50%, 0.8 intensity 3.3 MHz  50%  0.8 intensity 3.3 MHz  50%  0.8 intensity     Paraffin w/ MH 10' end of session 10' end of session 10' end of session 10' end of session.    Splinting

## 2024-01-31 ENCOUNTER — OFFICE VISIT (OUTPATIENT)
Dept: OCCUPATIONAL THERAPY | Facility: CLINIC | Age: 59
End: 2024-01-31
Payer: COMMERCIAL

## 2024-01-31 DIAGNOSIS — M65.312 TRIGGER FINGER OF LEFT THUMB: Primary | ICD-10-CM

## 2024-01-31 PROCEDURE — 97018 PARAFFIN BATH THERAPY: CPT

## 2024-01-31 PROCEDURE — 97110 THERAPEUTIC EXERCISES: CPT

## 2024-01-31 PROCEDURE — 97140 MANUAL THERAPY 1/> REGIONS: CPT

## 2024-02-14 ENCOUNTER — OFFICE VISIT (OUTPATIENT)
Dept: OCCUPATIONAL THERAPY | Facility: CLINIC | Age: 59
End: 2024-02-14
Payer: COMMERCIAL

## 2024-02-14 DIAGNOSIS — M65.312 TRIGGER FINGER OF LEFT THUMB: Primary | ICD-10-CM

## 2024-02-14 PROCEDURE — 97110 THERAPEUTIC EXERCISES: CPT

## 2024-02-14 PROCEDURE — 97018 PARAFFIN BATH THERAPY: CPT

## 2024-02-14 PROCEDURE — 97140 MANUAL THERAPY 1/> REGIONS: CPT

## 2024-02-14 NOTE — PROGRESS NOTES
Daily Note     Today's date: 2024  Patient name: Chelita King  : 1965  MRN: [id number]  Referring provider: Blanquita Villa MD  Dx:   Encounter Diagnosis     ICD-10-CM    1. Trigger finger of left thumb  M65.312                      Subjective: It's been pretty good.      Objective: See treatment diary below      Assessment: Tolerated treatment well. Patient demonstrated fatigue post treatment, exhibited good technique with therapeutic exercises, and would benefit from continued OT. Pt completed most exercises with minimal difficulty, pt verbalized clicking in thumb with thumb helper otherwise denied pain. Increase strength and functional use noted.      Plan: D/C OT tx. Pt will call if she has any issues.     Precautions: Trigger Finger  Initial Evaluation completed on: 2023  Re-Evaluation needs to be completed before: 2023  Insurance: [employer]  FOTO: 2023  Auth Visits: N/A        Manuals 2023 1/10/2024 2024 2024 2024   IASTM GT #3 8' Gt #3 GT #3 GT#3 thumb, palm and ext mass GT #3 thumb palm and ext mass   Digit Flexor Stretch        KT Tape        ART Thumb        Cupping   Extensor mass     Edema Manage Silicone Gel Sleeve               Neuro Re-Ed  2023 1/10/2024 2024 2024 2024                                           Ther Ex 2023 1/10/2024 2024 2024 2024   Thumb IP Blocking  MP Blocking        Thumb Extension Stretch        Thumb Abduction Stretch        Rubberband  Thumb Flexion  Thumb Ext  Radial Add  Radial Abd  Finger Abd Red RB  X 25  X 20      X 20 Red RB  X 25  X 20      X 20 Red RB  X 25  X 20      X 20 Blue RB  X 25  X 20      X 20 Blue RB  X 25  X 20      X 20   Opposition Tennis Ball  beads Tennis ball  beads Tennis ball  beads Tennis ball  beads Tennis ball  beads   Digi-Flex Red  X 20 Red  X 20 Green  X 20 Green  X 20 Green  X 20   Theraputty  Grasps  Pinches  Thumb  Presses        Thumb helper  Red RB x 20 Red RB x 20 2 Red RB's x 20 2 Red RB's  X 20   Power web    Thumb  Intrinsic plus Yellow  X 20  X 20 Yellow  X 20  X 20 Yellow  X 20  X 20 Teracotta  X 20  X 20 Teracotta  X 20  X 20           Ther Activity 12/29/2023 1/10/2024 1/17/2024 1/31/2024 2/14/2024   Tension Pin Pom Pom Yellow pin all poms Yellow pin all poms Y/RTP RTP RTP   Grooved Peg Board  1:29 1:35 1:21                            Modalities 12/29/2023 1/10/2024 1/17/2024 1/31/2024 2/14/2024   Ultrasound 3.3 Mhz, 50%, 0.8 intensity 3.3 MHz  50%  0.8 intensity 3.3 MHz  50%  0.8 intensity     Paraffin w/ MH 10' end of session 10' end of session 10' end of session 10' end of session. 10 end of session   Splinting